# Patient Record
Sex: MALE | URBAN - METROPOLITAN AREA
[De-identification: names, ages, dates, MRNs, and addresses within clinical notes are randomized per-mention and may not be internally consistent; named-entity substitution may affect disease eponyms.]

---

## 2018-08-22 ENCOUNTER — TRANSFERRED RECORDS (OUTPATIENT)
Dept: HEALTH INFORMATION MANAGEMENT | Facility: CLINIC | Age: 38
End: 2018-08-22

## 2019-12-03 ENCOUNTER — TRANSCRIBE ORDERS (OUTPATIENT)
Dept: OTHER | Age: 39
End: 2019-12-03

## 2019-12-03 DIAGNOSIS — R86.8 LOW VOLUME OF EJACULATED SEMEN: ICD-10-CM

## 2019-12-03 DIAGNOSIS — I86.1 BILATERAL VARICOCELES: ICD-10-CM

## 2019-12-03 DIAGNOSIS — R86.8 ASTHENOSPERMIA: ICD-10-CM

## 2019-12-03 DIAGNOSIS — Z31.69 INFERTILITY COUNSELING: Primary | ICD-10-CM

## 2019-12-04 NOTE — TELEPHONE ENCOUNTER
MEDICAL RECORDS REQUEST   [facility] Clinic  [address] [place] [address]  PHONE: [phone]  Fax: [phone]        FUTURE VISIT INFORMATION                                                   Abebe Aburto Daphne, : 1980 scheduled for future visit at [facility]    APPOINTMENT INFORMATION:    Date: 20 3PM    Provider:  Efren Sepulveda MD     Reason for Visit/Diagnosis: Infertility     REFERRAL INFORMATION:    Referring provider: Constantine Horan    Specialty: PA-C    [place] providers [place]  [facility]     [place] contact number:  [phone]     RECORDS REQUESTED FOR VISIT                                                     NOTES  STATUS/DETAILS   OFFICE NOTE from referring provider  yes   OFFICE NOTE from other specialist  yes   DISCHARGE SUMMARY from hospital  no   DISCHARGE REPORT from the ER  no   OPERATIVE REPORT  no   MEDICATION LIST  no   INFERTILITY     ALBUMIN  no   FSH  no   LAST UROLOGY/OB GYN VISIT NOTE  no   LH  no   SEMEN ANALYSIS (LAST 2)  yes   SHBG  no   T  no     PRE-VISIT CHECKLIST      Record collection complete Yes- [facility] [place]    Appointment appropriately scheduled           (right time/right provider) Yes   MyChart activation If no, please explain: In process    Questionnaire complete If no, please explain: in process      Completed by: Camelia Lee  
Awake/Alert

## 2020-01-07 ENCOUNTER — PRE VISIT (OUTPATIENT)
Dept: UROLOGY | Facility: CLINIC | Age: 40
End: 2020-01-07

## 2020-01-07 PROBLEM — E87.6 HYPOKALEMIA: Status: ACTIVE | Noted: 2018-03-23

## 2020-01-07 PROBLEM — G47.30 SLEEP APNEA: Status: ACTIVE | Noted: 2018-02-22

## 2020-01-07 PROBLEM — E26.09 PRIMARY ALDOSTERONISM (H): Status: ACTIVE | Noted: 2019-07-31

## 2020-01-07 PROBLEM — K21.9 GASTROESOPHAGEAL REFLUX DISEASE WITHOUT ESOPHAGITIS: Status: ACTIVE | Noted: 2018-02-22

## 2020-01-07 PROBLEM — I10 ESSENTIAL HYPERTENSION: Status: ACTIVE | Noted: 2018-02-22

## 2020-01-07 PROBLEM — M54.50 ACUTE MIDLINE LOW BACK PAIN WITHOUT SCIATICA: Status: ACTIVE | Noted: 2018-02-22

## 2020-01-07 NOTE — TELEPHONE ENCOUNTER
Patient coming in for fertility consult with Dr. Sepulveda.  in system. Asthenospermia. Patient chart reviewed, no need for call, all records available and ready for appointment.    Maddi Saavedra LPN  01/07/20  10:28 AM

## 2020-01-16 ENCOUNTER — APPOINTMENT (OUTPATIENT)
Dept: LAB | Facility: CLINIC | Age: 40
End: 2020-01-16
Payer: COMMERCIAL

## 2020-01-16 ENCOUNTER — OFFICE VISIT (OUTPATIENT)
Dept: UROLOGY | Facility: CLINIC | Age: 40
End: 2020-01-16
Attending: PHYSICIAN ASSISTANT
Payer: COMMERCIAL

## 2020-01-16 ENCOUNTER — PRE VISIT (OUTPATIENT)
Dept: UROLOGY | Facility: CLINIC | Age: 40
End: 2020-01-16

## 2020-01-16 VITALS — DIASTOLIC BLOOD PRESSURE: 104 MMHG | HEART RATE: 102 BPM | WEIGHT: 259.8 LBS | SYSTOLIC BLOOD PRESSURE: 153 MMHG

## 2020-01-16 DIAGNOSIS — N46.11 OLIGOSPERMIA: ICD-10-CM

## 2020-01-16 DIAGNOSIS — Z31.41 FERTILITY TESTING: Primary | ICD-10-CM

## 2020-01-16 DIAGNOSIS — R86.8 ASTHENOSPERMIA: ICD-10-CM

## 2020-01-16 LAB — FSH SERPL-ACNC: 3.3 IU/L (ref 0.7–10.8)

## 2020-01-16 PROCEDURE — 84270 ASSAY OF SEX HORMONE GLOBUL: CPT | Performed by: UROLOGY

## 2020-01-16 PROCEDURE — 82670 ASSAY OF TOTAL ESTRADIOL: CPT | Performed by: UROLOGY

## 2020-01-16 PROCEDURE — 84403 ASSAY OF TOTAL TESTOSTERONE: CPT | Performed by: UROLOGY

## 2020-01-16 RX ORDER — LISINOPRIL 20 MG/1
20 TABLET ORAL
COMMUNITY
Start: 2019-11-16

## 2020-01-16 RX ORDER — POTASSIUM CHLORIDE 750 MG/1
30 TABLET, EXTENDED RELEASE ORAL
COMMUNITY
Start: 2019-11-18

## 2020-01-16 RX ORDER — AMLODIPINE BESYLATE 10 MG/1
10 TABLET ORAL
COMMUNITY
Start: 2019-11-16

## 2020-01-16 ASSESSMENT — ENCOUNTER SYMPTOMS: MUSCLE CRAMPS: 1

## 2020-01-16 ASSESSMENT — PAIN SCALES - GENERAL: PAINLEVEL: NO PAIN (0)

## 2020-01-16 NOTE — PROGRESS NOTES
Dear Arias Horan, it was my pleasure to see Mr. Abebe Serna, a 39 year old male here in consultation today for fertility evaluation.      HPI  Abebe Serna is a 39 year old male with a history of hypertension who presents today for fertility evaluation.  He and his partner have been attempting to conceive for the last 12 years intermittently. His partner lives in [facility] and he sees her approximately once per year for 4 months at a time.  They have 3 previous pregnancy together (three sons). No pregnacies with other partners reported.  They have not tried timed intercourse, IUI, or IVF; assisted reproductive technology is difficult to access in [place]. He has struggled with intermittent ED and has a prescription for PDE-5 inhibitor which worked well for him, however he reports spontaneous resolution of his ED with improved diet and increased exercise.      The patient's partner, Bria, is 42 years old.  She is in good health.  She has been pregnant 3 times (has 3 sons with the patient). She has regular monthly menstrual cycles.  She has been evaluated for infertility and has had a normal work-up.  Her testing includes HSG.    The patient states he is happy with three sons and he doesn't necessarily desire any additional children, however his wife would like to attempt to have a girl. He has undergone at least 4 semen analyses in his lifetime, and each have demonstrated decreased motility.     PAST MEDICAL HISTORY:    HTN   Puberty normal   He has some mild ED which he states has improved spontaneously with diet and exercise  No  problems.     PAST SURG HISTORY  History reviewed. No pertinent surgical history.     Medications as of 1/16/2020:  Current Outpatient Medications   Medication Sig     amLODIPine (NORVASC) 10 MG tablet Take 10 mg by mouth     lisinopril (PRINIVIL/ZESTRIL) 20 MG tablet Take 20 mg by mouth     potassium chloride ER (K-TAB/KLOR-CON) 10 MEQ CR tablet  Take 30 mEq by mouth     No current facility-administered medications for this visit.       ALLERGY:   No Known Allergies    SOCIAL HISTORY:  In a committed relationship- not . Occupation: [employer] .  Occasional alcohol use. No tobacco or illicit drug use.   Social History     Tobacco Use     Smoking status: Never Smoker     Smokeless tobacco: Never Used   Substance Use Topics     Alcohol use: Not Currently     Drug use: Never     FAMILY HISTORY: No family hx infertility.  inherited disorders.   Family History   Problem Relation Age of Onset     Hypertension Mother      Hypertension Father        REVIEW OF SYSTEMS:  Answers for HPI/ROS submitted by the patient on 1/16/2020   General Symptoms: No  Skin Symptoms: No  HENT Symptoms: No  EYE SYMPTOMS: No  HEART SYMPTOMS: No  LUNG SYMPTOMS: No  INTESTINAL SYMPTOMS: No  URINARY SYMPTOMS: No  REPRODUCTIVE SYMPTOMS: No  SKELETAL SYMPTOMS: Yes  BLOOD SYMPTOMS: No  NERVOUS SYSTEM SYMPTOMS: No  MENTAL HEALTH SYMPTOMS: No  Muscle cramps: Yes    GONADOTOXIN EXPOSURE: Unremarkable. Otherwise negative for marijuana, heat, chemicals, pesticides, heavy metals, steroids, chemotherapy or radiation.    GENERAL PHYSICAL EXAM  BP (!) 153/104   Pulse 102   Wt 117.8 kg (259 lb 12.8 oz)    Constitutional: No acute distress. Well nourished.   PSYCH: normal mood and affect.  NEURO: normal gait, no focal deficits.   EYES: anicteric, EOMI, PERR  ENT: neck supple,  mucosae moist, no thrush.  CARDIOPULMONARY: breathing non-labored, pulse regular, no peripheral edema.  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  MUSCULOSKELETAL: normal limb proportions, no muscle wasting, no contractures.  SKIN: Normal virilized hair distribution, no lesions, warts or rashes over genitalia, abdomen extremities or face.  HEME/LYMPH: no ecchymosis, no lymphadenopathy in  groin or neck, no lymphedema.     EXAM:  Phallus circumcised, meatus adequate, no plaques palpated.   Left testis descended , size is 20cc , consistency is normal. No intra-testicular masses.   Right testis descended , size is 20cc , consistency is normal. No intra-testicular masses.   Epididymes present, non-tender, non enlarged.   Left cord: Vas present. no varicocele noted.  Right cord: Vas present. no varicocele noted.     Rectal exam deferred.     LABS:  Testosterone 430.1     11/2019 Semen Analysis:  (normal range in parenthesis)   -Volume: 0.5 ml (1.5-5.0)   -pH: 7.5 (>7.2)   -Concentration: 48.3 million/ml (>15 million/ml)   -% Forward progressive: 2% (>30%)   -Total progressive motile count: 0.97 (>15.6 million)    -% Normal morphology: 15% (>4%)    7/2019 Semen Analysis:  (normal range in parenthesis)   -Volume: 1.0 ml (1.5-5.0)   -pH: 7.5 (>7.2)   -Concentration: 73.8 million/ml (>15 million/ml)   -% Forward progressive: 20% (>30%)   -Total progressive motile count: 14.8 (>15.6 million)   -% Normal morphology: 7% (>4%)    ASSESSMENT:  Abebe Serna is a 39 year-old male who presents today for infertility evaluation. His two latest SAs demonstrate oligospermia, asthenospermia.    PLAN:    Hormonal panel including testosterone, estradiol, and FSH    SA with RA.    Will schedule patient for TRUS to evaluate for ejaculatory duct obstruction, given low semen volumes.    Patient was seen and examined with Dr. Derick De Paz MD  PGY-2 Urology  Pager [phone]      I saw and examined the patient with the resident today.  I agree with the resident note and plan of care as above.     Efren Sepulveda MD  Urology Staff

## 2020-01-16 NOTE — PATIENT INSTRUCTIONS
Lab work today.    Semen analysis    Return for a transrectal ultrasound.    It was a pleasure meeting with you today.  Thank you for allowing me and my team the privilege of caring for you today.  YOU are the reason we are here, and I truly hope we provided you with the excellent service you deserve.  Please let us know if there is anything else we can do for you so that we can be sure you are leaving completely satisfied with your care experience.

## 2020-01-16 NOTE — LETTER
1/16/2020       RE: Abebe Serna  [address]  [facility] [place] [address]     Dear Colleague,    Thank you for referring your patient, Abebe Serna, to the [facility] AND [facility] UROLOGIC [facility] at [facility]. Please see a copy of my visit note below.    Dear Arias Horan, it was my pleasure to see Mr. Abebe Serna, a 39 year old male here in consultation today for fertility evaluation.      HPI  Abebe Serna is a 39 year old male with a history of hypertension who presents today for fertility evaluation.  He and his partner have been attempting to conceive for the last 12 years intermittently. His partner lives in [facility] and he sees her approximately once per year for 4 months at a time.  They have 3 previous pregnancy together (three sons). No pregnacies with other partners reported.  They have not tried timed intercourse, IUI, or IVF; assisted reproductive technology is difficult to access in [place]. He has struggled with intermittent ED and has a prescription for PDE-5 inhibitor which worked well for him, however he reports spontaneous resolution of his ED with improved diet and increased exercise.      The patient's partner, Bria, is 42 years old.  She is in good health.  She has been pregnant 3 times (has 3 sons with the patient). She has regular monthly menstrual cycles.  She has been evaluated for infertility and has had a normal work-up.  Her testing includes HSG.    The patient states he is happy with three sons and he doesn't necessarily desire any additional children, however his wife would like to attempt to have a girl. He has undergone at least 4 semen analyses in his lifetime, and each have demonstrated decreased motility.     PAST MEDICAL HISTORY:    HTN   Puberty normal   He has some mild ED which he states has improved spontaneously with diet and  exercise  No  problems.     PAST SURG HISTORY  History reviewed. No pertinent surgical history.     Medications as of 1/16/2020:  Current Outpatient Medications   Medication Sig     amLODIPine (NORVASC) 10 MG tablet Take 10 mg by mouth     lisinopril (PRINIVIL/ZESTRIL) 20 MG tablet Take 20 mg by mouth     potassium chloride ER (K-TAB/KLOR-CON) 10 MEQ CR tablet Take 30 mEq by mouth     No current facility-administered medications for this visit.       ALLERGY:   No Known Allergies    SOCIAL HISTORY:  In a committed relationship- not . Occupation: [employer] .  Occasional alcohol use. No tobacco or illicit drug use.   Social History     Tobacco Use     Smoking status: Never Smoker     Smokeless tobacco: Never Used   Substance Use Topics     Alcohol use: Not Currently     Drug use: Never     FAMILY HISTORY: No family hx infertility.  inherited disorders.   Family History   Problem Relation Age of Onset     Hypertension Mother      Hypertension Father        REVIEW OF SYSTEMS:  Answers for HPI/ROS submitted by the patient on 1/16/2020   General Symptoms: No  Skin Symptoms: No  HENT Symptoms: No  EYE SYMPTOMS: No  HEART SYMPTOMS: No  LUNG SYMPTOMS: No  INTESTINAL SYMPTOMS: No  URINARY SYMPTOMS: No  REPRODUCTIVE SYMPTOMS: No  SKELETAL SYMPTOMS: Yes  BLOOD SYMPTOMS: No  NERVOUS SYSTEM SYMPTOMS: No  MENTAL HEALTH SYMPTOMS: No  Muscle cramps: Yes    GONADOTOXIN EXPOSURE: Unremarkable. Otherwise negative for marijuana, heat, chemicals, pesticides, heavy metals, steroids, chemotherapy or radiation.    GENERAL PHYSICAL EXAM  BP (!) 153/104   Pulse 102   Wt 117.8 kg (259 lb 12.8 oz)    Constitutional: No acute distress. Well nourished.   PSYCH: normal mood and affect.  NEURO: normal gait, no focal deficits.   EYES: anicteric, EOMI, PERR  ENT: neck supple,  mucosae moist, no thrush.  CARDIOPULMONARY: breathing non-labored, pulse regular, no peripheral edema.  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  MUSCULOSKELETAL: normal limb proportions, no muscle wasting, no contractures.  SKIN: Normal virilized hair distribution, no lesions, warts or rashes over genitalia, abdomen extremities or face.  HEME/LYMPH: no ecchymosis, no lymphadenopathy in groin or neck, no lymphedema.     EXAM:  Phallus circumcised, meatus adequate, no plaques palpated.   Left testis descended , size is 20cc , consistency is normal. No intra-testicular masses.   Right testis descended , size is 20cc , consistency is normal. No intra-testicular masses.   Epididymes present, non-tender, non enlarged.   Left cord: Vas present. no varicocele noted.  Right cord: Vas present. no varicocele noted.     Rectal exam deferred.     LABS:  Testosterone 430.1     11/2019 Semen Analysis:  (normal range in parenthesis)   -Volume: 0.5 ml (1.5-5.0)   -pH: 7.5 (>7.2)   -Concentration: 48.3 million/ml (>15 million/ml)   -% Forward progressive: 2% (>30%)   -Total progressive motile count: 0.97 (>15.6 million)    -% Normal morphology: 15% (>4%)    7/2019 Semen Analysis:  (normal range in parenthesis)   -Volume: 1.0 ml (1.5-5.0)   -pH: 7.5 (>7.2)   -Concentration: 73.8 million/ml (>15 million/ml)   -% Forward progressive: 20% (>30%)   -Total progressive motile count: 14.8 (>15.6 million)   -% Normal morphology: 7% (>4%)    ASSESSMENT:  Abebe Serna is a 39 year-old male who presents today for infertility evaluation. His two latest SAs demonstrate oligospermia, asthenospermia.    PLAN:    Hormonal panel including testosterone, estradiol, and FSH    SA with RA.    Will schedule patient for TRUS to evaluate for ejaculatory duct obstruction, given low semen volumes.    Patient was seen and examined with Dr. Derick De Paz MD  PGY-2 Urology  Pager [phone]      I saw and examined the patient with the resident today.  I agree with the resident  note and plan of care as above.     Efren Sepulveda MD  Urology Staff

## 2020-01-16 NOTE — LETTER
January 24, 2020       TO: Abebe Serna  [address]  [facility] [place] [address]       DearMr.Daphne,    We are writing to inform you of your test results.    Your test results fall within the expected range(s) or remain unchanged from previous results.  Please continue with current treatment plan.    Resulted Orders   Testosterone Free and Total   Result Value Ref Range    Testosterone Total 300 240 - 950 ng/dL      Comment:      This test was developed and its performance characteristics determined by the   [facility].   It has not been cleared or approved by the FDA. The laboratory is regulated   under CLIA as qualified to perform high-complexity testing. This test is used   for clinical purposes. It should not be regarded as investigational or for   research.      Sex Hormone Binding Globulin 40 11 - 80 nmol/L    Free Testosterone Calculated 5.23 4.7 - 24.4 ng/dL   Follicle stimulating hormone   Result Value Ref Range    FSH 3.3 0.7 - 10.8 IU/L   Estradiol ultrasensitive   Result Value Ref Range    Estradiol Ultrasensitive 31 10 - 40 pg/mL      Comment:      Reference Ranges  Prepubertal Males:  0-13 pg/mL  Adult Males:  10-40 pg/mL  This test was developed and its performance characteristics determined by the   [facility].   It has not been cleared or approved by the FDA. The laboratory is regulated   under CLIA as qualified to perform high-complexity testing. This test is used   for clinical purposes. It should not be regarded as investigational or for   research.         Dear Abebe,   Here are your recent results.     Blood labs are all normal, no concerns.     Calculated free ( active) testosterone is 5.23 ng/dL ( >4.7 is normal), so testosterone level looks great.   There is a normal testosterone to estrogen ratio.   FSH is the signal  "from the brain to the testicles to drive sperm production.  Your FSH level is normal, I prefer to see this under 7.5 or so.       An option from the male side is to use a prescription medication like Clomid to fine tune your hormones some, and hopefully improve sperm production.  Clomid is marketed as a female fertility medication but is used commonly \"off-label\" for treating men as well.  Clomid helps the body increase stimulation to the testicle to try to improve sperm production.  Results are variable (sometimes doesn't work at all) and results take at least 3-4 months on the medication to see possible improvement in semen analysis parameters, due to the slow rate of sperm production.  Side effects are uncommon but can include decreased libido, breast tenderness, or water weight gain.  Let me know if you are interested in trying this.       Thank You   Let me know if you have any questions.     Nancy ABREU     "

## 2020-01-16 NOTE — LETTER
January 24, 2020       TO: Abebe Serna  [address]  [facility] [place] [address]       DearMr.Daphne,    We are writing to inform you of your test results.    Your test results fall within the expected range(s) or remain unchanged from previous results.  Please continue with current treatment plan.    Resulted Orders   Testosterone Free and Total   Result Value Ref Range    Testosterone Total 300 240 - 950 ng/dL      Comment:      This test was developed and its performance characteristics determined by the   [facility].   It has not been cleared or approved by the FDA. The laboratory is regulated   under CLIA as qualified to perform high-complexity testing. This test is used   for clinical purposes. It should not be regarded as investigational or for   research.      Sex Hormone Binding Globulin 40 11 - 80 nmol/L    Free Testosterone Calculated 5.23 4.7 - 24.4 ng/dL   Follicle stimulating hormone   Result Value Ref Range    FSH 3.3 0.7 - 10.8 IU/L   Estradiol ultrasensitive   Result Value Ref Range    Estradiol Ultrasensitive 31 10 - 40 pg/mL      Comment:      Reference Ranges  Prepubertal Males:  0-13 pg/mL  Adult Males:  10-40 pg/mL  This test was developed and its performance characteristics determined by the   [facility].   It has not been cleared or approved by the FDA. The laboratory is regulated   under CLIA as qualified to perform high-complexity testing. This test is used   for clinical purposes. It should not be regarded as investigational or for   research.         Dear Abebe,   Here are your recent results.     Blood labs are all normal, no concerns.     Calculated free ( active) testosterone is 5.23 ng/dL ( >4.7 is normal), so testosterone level looks great.   There is a normal testosterone to estrogen ratio.   FSH is the signal  "from the brain to the testicles to drive sperm production.  Your FSH level is normal, I prefer to see this under 7.5 or so.       An option from the male side is to use a prescription medication like Clomid to fine tune your hormones some, and hopefully improve sperm production.  Clomid is marketed as a female fertility medication but is used commonly \"off-label\" for treating men as well.  Clomid helps the body increase stimulation to the testicle to try to improve sperm production.  Results are variable (sometimes doesn't work at all) and results take at least 3-4 months on the medication to see possible improvement in semen analysis parameters, due to the slow rate of sperm production.  Side effects are uncommon but can include decreased libido, breast tenderness, or water weight gain.  Let me know if you are interested in trying this.       Thank You   Let me know if you have any questions.     Nancy ABREU     "

## 2020-01-16 NOTE — NURSING NOTE
Chief Complaint   Patient presents with     New Patient     Fertility consult       Blood pressure (!) 153/104, pulse 102, weight 117.8 kg (259 lb 12.8 oz). There is no height or weight on file to calculate BMI.    Patient Active Problem List   Diagnosis     Acute midline low back pain without sciatica     Essential hypertension     Gastroesophageal reflux disease without esophagitis     Hypokalemia     Primary aldosteronism (H)     Sleep apnea       No Known Allergies    Current Outpatient Medications   Medication Sig Dispense Refill     amLODIPine (NORVASC) 10 MG tablet Take 10 mg by mouth       lisinopril (PRINIVIL/ZESTRIL) 20 MG tablet Take 20 mg by mouth       potassium chloride ER (K-TAB/KLOR-CON) 10 MEQ CR tablet Take 30 mEq by mouth         Social History     Tobacco Use     Smoking status: Never Smoker     Smokeless tobacco: Never Used   Substance Use Topics     Alcohol use: Not Currently     Drug use: Never       Maddi Saavedra LPN  1/16/2020  3:03 PM

## 2020-01-17 DIAGNOSIS — Z31.41 FERTILITY TESTING: ICD-10-CM

## 2020-01-17 PROCEDURE — 89322 SEMEN ANAL STRICT CRITERIA: CPT

## 2020-01-18 LAB
SHBG SERPL-SCNC: 40 NMOL/L (ref 11–80)
TESTOST FREE SERPL-MCNC: 5.23 NG/DL (ref 4.7–24.4)
TESTOST SERPL-MCNC: 300 NG/DL (ref 240–950)

## 2020-01-21 LAB
ABNORMAL SPERM: 99 MORPHOLOGY
ABSTINENCE DAYS: 4 DAYS (ref 2–7)
AGGLUTINATION: NO YES/NO
ANALYSIS TEMP - CENTIGRADE: 22 CENTIGRADE
CELL FRAGMENTS: ABNORMAL %
COLLECTION METHOD: ABNORMAL
COLLECTION SITE: ABNORMAL
CONSENT TO RELEASE TO PARTNER: NO
ESTRADIOL SERPL HS-MCNC: 31 PG/ML (ref 10–40)
HEAD DEFECT: 99
IMMATURE SPERM: ABNORMAL %
IMMOTILE: 100 %
LAB RECEIPT TIME: ABNORMAL
LIQUEFIED: YES YES/NO
MIDPIECE DEFECT: 56
NON-PROGRESSIVE MOTILITY: 0 %
NORMAL SPERM: 1 % NORMAL FORMS (ref 4–?)
PROGRESSIVE MOTILITY: 0 % (ref 32–?)
ROUND CELLS: 0 MILLION/ML (ref ?–2)
SPECIMEN CONCENTRATION: 5 MILLION/ML (ref 15–?)
SPECIMEN PH: 6.8 PH (ref 7.2–?)
SPECIMEN TYPE: ABNORMAL
SPECIMEN VOL UR: 1.5 ML (ref 1.5–?)
TAIL DEFECT: 21
TIME OF ANALYSIS: ABNORMAL
TOTAL NUMBER: 8 MILLION (ref 39–?)
TOTAL PROGRESSIVE MOTILE: 0 MILLION (ref 15.6–?)
VISCOUS: NO YES/NO
VITALITY: 10 % (ref 58–?)
WBC SPECIMEN: ABNORMAL %

## 2020-01-24 NOTE — RESULT ENCOUNTER NOTE
"Dear Abebe,   Here are your recent results.     Blood labs are all normal, no concerns.    Calculated free ( active) testosterone is 5.23 ng/dL ( >4.7 is normal), so testosterone level looks great.   There is a normal testosterone to estrogen ratio.  FSH is the signal from the brain to the testicles to drive sperm production.  Your FSH level is normal, I prefer to see this under 7.5 or so.    An option from the male side is to use a prescription medication like Clomid to fine tune your hormones some, and hopefully improve sperm production.  Clomid is marketed as a female fertility medication but is used commonly \"off-label\" for treating men as well.  Clomid helps the body increase stimulation to the testicle to try to improve sperm production.  Results are variable (sometimes doesn't work at all) and results take at least 3-4 months on the medication to see possible improvement in semen analysis parameters, due to the slow rate of sperm production.  Side effects are uncommon but can include decreased libido, breast tenderness, or water weight gain.  Let me know if you are interested in trying this.      Thank You  Let me know if you have any questions.    Nancy ABREU"

## 2020-02-04 ENCOUNTER — PRE VISIT (OUTPATIENT)
Dept: UROLOGY | Facility: CLINIC | Age: 40
End: 2020-02-04

## 2020-02-04 NOTE — TELEPHONE ENCOUNTER
Reason for Visit: TRUS (no bx, no prep needed)    Diagnosis: low ejaculate volume    Orders/Procedures/Records: in system    Contact Patient: n/a    Rooming Requirements: TRUS      Maddi Saavedra LPN  02/04/20  12:42 PM

## 2020-02-21 ENCOUNTER — OFFICE VISIT (OUTPATIENT)
Dept: UROLOGY | Facility: CLINIC | Age: 40
End: 2020-02-21
Payer: COMMERCIAL

## 2020-02-21 VITALS — DIASTOLIC BLOOD PRESSURE: 124 MMHG | HEART RATE: 65 BPM | WEIGHT: 259 LBS | SYSTOLIC BLOOD PRESSURE: 165 MMHG

## 2020-02-21 DIAGNOSIS — Z31.41 FERTILITY TESTING: Primary | ICD-10-CM

## 2020-02-21 RX ORDER — DOXAZOSIN 1 MG/1
2 TABLET ORAL
COMMUNITY
Start: 2020-02-20

## 2020-02-21 RX ORDER — METHOCARBAMOL 500 MG/1
TABLET, FILM COATED ORAL
COMMUNITY
Start: 2020-02-14

## 2020-02-21 RX ORDER — CLOMIPHENE CITRATE 50 MG/1
25 TABLET ORAL
Qty: 20 TABLET | Refills: 1 | Status: SHIPPED | OUTPATIENT
Start: 2020-02-21

## 2020-02-21 ASSESSMENT — PAIN SCALES - GENERAL
PAINLEVEL: NO PAIN (0)
PAINLEVEL: NO PAIN (0)

## 2020-02-21 NOTE — LETTER
"2/21/2020       RE: Abebe Serna  [address]  [facility] [place] [address]     Dear Colleague,    Thank you for referring your patient, Abebe Serna, to the [facility] AND [facility] PROSTATE AND UROLOGIC CANCERS at [facility]. Please see a copy of my visit note below.    Procedure Note    Pre-operative diagnosis: Low semen vol   Post-operative diagnosis No ejaculatory duct obstruction.   Procedure: Trans-rectal ultrasound of the prostate   Surgeon: Efren Sepulveda MD   Assistants(s): None    Estimated blood loss: Minimal    Specimens: None   Findings: As below     Abebe Serna is a 39 year old male  is in for his TRUS of the prostate.  Reason for the ultrasound is: low semen volume, necrospermia   Low testosterone level, good candidate for Clomid     Procedure:  Pt was positioned in left lateral decubitus position.   RASHI indicates a smooth, symmetrical prostate, apex only..   The trans-rectal ultrasound probe was inserted and the prostate examined with biplanar ultrasound.    His prostate measures approx. 16 cc.  Capsule: Distinct   SVs:  Normal   Calcifications: None   Hypoechoic areas: None       No complications were noted, he tolerated the procedure well.      Assessment-  Low semen volume  Necrospermia   No ejaculatory duct obstruction.  Lower testosterone level.    Plan-  Empirical therapy with Clomid discussed.  He would like to do this.    Discussed that an option from the male side is to use a prescription medication like Clomid to fine tune his hormones some, and hopefully improve sperm production.  Discussed that Clomid is marketed as a female fertility medication but is used commonly \"off-label\" for treating men as well.  Clomid helps the body increase stimulation to the testicles to try to improve sperm production.  Results are variable (sometimes doesn't work at all) and results take at least 3-4 months on " the medication to see possible improvement in semen analysis parameters, due to the slow rate of sperm production.  Side effects are uncommon but can include decreased libido, breast tenderness, or water weight gain.  He would like to try this.    Clomid labs 3-4 weeks and go from there.    Nancy ABREU       10min visit, over 50% face to face in counseling/discussion of above issues.     Again, thank you for allowing me to participate in the care of your patient.      Sincerely,    Efren Sepulveda MD

## 2020-02-21 NOTE — PATIENT INSTRUCTIONS
Schedule labs for one month.        It was a pleasure meeting with you today.  Thank you for allowing me and my team the privilege of caring for you today.  YOU are the reason we are here, and I truly hope we provided you with the excellent service you deserve.  Please let us know if there is anything else we can do for you so that we can be sure you are leaving completely satisfied with your care experience.

## 2020-02-21 NOTE — PROGRESS NOTES
"Procedure Note    Pre-operative diagnosis: Low semen vol   Post-operative diagnosis No ejaculatory duct obstruction.   Procedure: Trans-rectal ultrasound of the prostate   Surgeon: Efren Sepulveda MD   Assistants(s): None    Estimated blood loss: Minimal    Specimens: None   Findings: As below     Abebe Serna is a 39 year old male  is in for his TRUS of the prostate.  Reason for the ultrasound is: low semen volume, necrospermia   Low testosterone level, good candidate for Clomid     Procedure:  Pt was positioned in left lateral decubitus position.   RASHI indicates a smooth, symmetrical prostate, apex only..   The trans-rectal ultrasound probe was inserted and the prostate examined with biplanar ultrasound.    His prostate measures approx. 16 cc.  Capsule: Distinct   SVs:  Normal   Calcifications: None   Hypoechoic areas: None       No complications were noted, he tolerated the procedure well.      Assessment-  Low semen volume  Necrospermia   No ejaculatory duct obstruction.  Lower testosterone level.    Plan-  Empirical therapy with Clomid discussed.  He would like to do this.    Discussed that an option from the male side is to use a prescription medication like Clomid to fine tune his hormones some, and hopefully improve sperm production.  Discussed that Clomid is marketed as a female fertility medication but is used commonly \"off-label\" for treating men as well.  Clomid helps the body increase stimulation to the testicles to try to improve sperm production.  Results are variable (sometimes doesn't work at all) and results take at least 3-4 months on the medication to see possible improvement in semen analysis parameters, due to the slow rate of sperm production.  Side effects are uncommon but can include decreased libido, breast tenderness, or water weight gain.  He would like to try this.    Clomid labs 3-4 weeks and go from there.    Nancy ABREU       10min visit, over 50% face to face in " counseling/discussion of above issues.

## 2020-02-21 NOTE — NURSING NOTE
Chief Complaint   Patient presents with     Minor Procedure     TRUS       Blood pressure (!) 165/124, pulse 65, weight 117.5 kg (259 lb). There is no height or weight on file to calculate BMI.    Patient Active Problem List   Diagnosis     Acute midline low back pain without sciatica     Essential hypertension     Gastroesophageal reflux disease without esophagitis     Hypokalemia     Primary aldosteronism (H)     Sleep apnea       No Known Allergies    Current Outpatient Medications   Medication Sig Dispense Refill     amLODIPine (NORVASC) 10 MG tablet Take 10 mg by mouth       doxazosin 1 MG PO tablet Take 2 mg by mouth       lisinopril (PRINIVIL/ZESTRIL) 20 MG tablet Take 20 mg by mouth       methocarbamol 500 MG PO tablet 1 to 2 tabs every 8 hours as needed for groin pain       potassium chloride ER (K-TAB/KLOR-CON) 10 MEQ CR tablet Take 30 mEq by mouth         Social History     Tobacco Use     Smoking status: Never Smoker     Smokeless tobacco: Never Used   Substance Use Topics     Alcohol use: Not Currently     Drug use: Never       Invasive Procedure Safety Checklist:    Procedure: TRUS (no bx)    Action: Complete sections and checkboxes as appropriate.    Pre-procedure:  1. Patient ID Verified with 2 identifiers (Danielle and  or MRN) : YES    2. Procedure and site verified with patient/designee (when able) : YES    3. Accurate consent documentation in medical record : YES    4. H&P (or appropriate assessment) documented in medical record : N/A  H&P must be up to 30 days prior to procedure an updated within 24 hours of                 Procedure as applicable.     5. Relevant diagnostic and radiology test results appropriately labeled and displayed as applicable : YES    6. Blood products, implants, devices, and/or special equipment available for the procedure as applicable : YES    7. Procedure site(s) marked with provider initials [Exclusions: none] : NO    8. Marking not required. Reason : Yes  Procedure  does not require site marking    Time Out:     Time-Out performed immediately prior to starting procedure, including verbal and active participation of all team members addressing: YES    1. Correct patient identity.  2. Confirmed that the correct side and site are marked.  3. An accurate procedure to be done.  4. Agreement on the procedure to be done.  5. Correct patient position.  6. Relevant images and results are properly labeled and appropriately displayed.  7. The need to administer antibiotics or fluids for irrigation purposes during the procedure as applicable.  8. Safety precautions based on patient history or medication use.    During Procedure: Verification of correct person, site, and procedure occurs any time the responsibility for care of the patient is transferred to another member of the care team.    No medications given during this visit.    Maddi Saavedra LPN  2/21/2020  10:52 AM

## 2022-01-07 ENCOUNTER — MEDICAL CORRESPONDENCE (OUTPATIENT)
Dept: HEALTH INFORMATION MANAGEMENT | Facility: CLINIC | Age: 42
End: 2022-01-07
Payer: COMMERCIAL

## 2022-01-07 ENCOUNTER — TRANSFERRED RECORDS (OUTPATIENT)
Dept: HEALTH INFORMATION MANAGEMENT | Facility: CLINIC | Age: 42
End: 2022-01-07
Payer: COMMERCIAL

## 2022-01-21 DIAGNOSIS — E26.09 PRIMARY ALDOSTERONISM (H): Primary | ICD-10-CM

## 2023-04-17 DIAGNOSIS — E26.09 PRIMARY ALDOSTERONISM (H): Primary | ICD-10-CM

## 2023-05-18 ENCOUNTER — TELEPHONE (OUTPATIENT)
Dept: RADIOLOGY | Facility: CLINIC | Age: 43
End: 2023-05-18
Payer: COMMERCIAL

## 2023-05-18 NOTE — TELEPHONE ENCOUNTER
5/10: LVM   4/25: LVM  4/18: LVM: Pt needs CTA and [facility] visit - due anytime     Letter sent on 5/18